# Patient Record
Sex: FEMALE | Race: WHITE | Employment: FULL TIME | URBAN - NONMETROPOLITAN AREA
[De-identification: names, ages, dates, MRNs, and addresses within clinical notes are randomized per-mention and may not be internally consistent; named-entity substitution may affect disease eponyms.]

---

## 2022-09-17 ENCOUNTER — OFFICE VISIT (OUTPATIENT)
Dept: PRIMARY CARE CLINIC | Age: 27
End: 2022-09-17
Payer: COMMERCIAL

## 2022-09-17 VITALS
HEART RATE: 88 BPM | HEIGHT: 68 IN | BODY MASS INDEX: 40.94 KG/M2 | RESPIRATION RATE: 18 BRPM | SYSTOLIC BLOOD PRESSURE: 112 MMHG | OXYGEN SATURATION: 96 % | WEIGHT: 270.13 LBS | TEMPERATURE: 98.1 F | DIASTOLIC BLOOD PRESSURE: 76 MMHG

## 2022-09-17 DIAGNOSIS — S46.911A STRAIN OF RIGHT SHOULDER, INITIAL ENCOUNTER: Primary | ICD-10-CM

## 2022-09-17 DIAGNOSIS — S46.911A STRAIN OF ACROMIOCLAVICULAR JOINT, RIGHT, INITIAL ENCOUNTER: ICD-10-CM

## 2022-09-17 PROCEDURE — 99203 OFFICE O/P NEW LOW 30 MIN: CPT | Performed by: FAMILY MEDICINE

## 2022-09-17 RX ORDER — PREDNISONE 20 MG/1
TABLET ORAL
Qty: 15 TABLET | Refills: 0 | Status: SHIPPED | OUTPATIENT
Start: 2022-09-17

## 2022-09-17 NOTE — PROGRESS NOTES
2022     Diann Agosto (:  1995) is a 32 y.o. female, here for evaluation of the following medical concerns:    Shoulder Pain   The pain is present in the right shoulder. This is a new problem. The current episode started in the past 7 days. There has been a history of trauma (was wrestling with her fiance and states that she believes her shoulder got strained. ). Pertinent negatives include no limited range of motion. Associated symptoms comments: Pain didn't occur immediately at the time but the next day shoulder started to get more sore and painful. Hasn't gotten much better throughout the week. . She has tried acetaminophen, NSAIDS and cold for the symptoms. The treatment provided no relief. Did review patient's med list, allergies, social history,pmhx and pshx today as noted in the record. Review of Systems   Musculoskeletal:  Positive for arthralgias and myalgias. Negative for gait problem. Prior to Visit Medications    Not on File        Social History     Tobacco Use    Smoking status: Never     Passive exposure: Never    Smokeless tobacco: Never   Substance Use Topics    Alcohol use: Not on file        Vitals:    22 1204   BP: 112/76   Site: Left Upper Arm   Position: Sitting   Cuff Size: Large Adult   Pulse: 88   Resp: 18   Temp: 98.1 °F (36.7 °C)   TempSrc: Tympanic   SpO2: 96%   Weight: 270 lb 2 oz (122.5 kg)   Height: 5' 8\" (1.727 m)     Estimated body mass index is 41.07 kg/m² as calculated from the following:    Height as of this encounter: 5' 8\" (1.727 m). Weight as of this encounter: 270 lb 2 oz (122.5 kg). Physical Exam  Vitals and nursing note reviewed. Constitutional:       General: She is not in acute distress. Appearance: She is well-developed. She is not diaphoretic. HENT:      Head: Normocephalic and atraumatic.    Eyes:      Conjunctiva/sclera: Conjunctivae normal.   Pulmonary:      Effort: Pulmonary effort is normal.   Musculoskeletal: General: Tenderness present. No swelling. Normal range of motion. Cervical back: Normal range of motion. Comments: Right shoulder with pain with palpation to the [facility] joint and with posterior movement of the joint. Moves the shoulder in a normal range of motion. Normal muscle strength   Skin:     General: Skin is warm and dry. Coloration: Skin is not pale. Findings: No erythema or rash. Neurological:      Mental Status: She is alert and oriented to person, place, and time. Psychiatric:         Behavior: Behavior normal.         Thought Content: Thought content normal.         Judgment: Judgment normal.       ASSESSMENT/PLAN:  Encounter Diagnoses   Name Primary? Strain of right shoulder, initial encounter Yes    Strain of acromioclavicular joint, right, initial encounter      Orders Placed This Encounter   Medications    predniSONE (DELTASONE) 20 MG tablet     Si bid for 5 days, 1 qd for 5 days     Dispense:  15 tablet     Refill:  0     No orders of the defined types were placed in this encounter. Suspect patient did strain her right AC joint based on area of reproducible pain and symptoms. At this point we will place her on a course of oral steroid to help calm down the acute inflammation in the shoulder joint. Did advise her to rest and use ice to the area. Tylenol/Motrin prn for pain. Return  if no improvement in symptoms or if any further symptoms arise. No follow-ups on file. An electronic signature was used to authenticate this note.     --Ron Gallardo, DO on 2022 at 12:51 PM

## 2022-10-10 LAB — HCG QUANTITATIVE: 101.9 MUNIT/ML (ref 0–5)

## 2022-10-12 LAB — HCG QUANTITATIVE: 32.9 MUNIT/ML (ref 0–5)
